# Patient Record
Sex: FEMALE | Race: WHITE | NOT HISPANIC OR LATINO | Employment: OTHER | URBAN - METROPOLITAN AREA
[De-identification: names, ages, dates, MRNs, and addresses within clinical notes are randomized per-mention and may not be internally consistent; named-entity substitution may affect disease eponyms.]

---

## 2024-04-01 ASSESSMENT — PROMIS GLOBAL HEALTH SCALE
RATE_PHYSICAL_HEALTH: VERY GOOD
EMOTIONAL_PROBLEMS: NEVER
CARRYOUT_PHYSICAL_ACTIVITIES: COMPLETELY
RATE_QUALITY_OF_LIFE: VERY GOOD
RATE_AVERAGE_FATIGUE: MILD
RATE_GENERAL_HEALTH: VERY GOOD
RATE_SOCIAL_SATISFACTION: VERY GOOD
CARRYOUT_SOCIAL_ACTIVITIES: EXCELLENT
RATE_MENTAL_HEALTH: VERY GOOD
RATE_AVERAGE_PAIN: 0

## 2024-04-07 PROBLEM — R42 VERTIGO: Status: ACTIVE | Noted: 2024-04-07

## 2024-04-07 PROBLEM — M62.82 RHABDOMYOLYSIS: Status: ACTIVE | Noted: 2024-04-07

## 2024-04-07 PROBLEM — M25.512 SHOULDER PAIN, LEFT: Status: ACTIVE | Noted: 2024-04-07

## 2024-04-07 PROBLEM — M81.0 OSTEOPOROSIS: Status: ACTIVE | Noted: 2024-04-07

## 2024-04-07 PROBLEM — M79.673 FOOT PAIN: Status: ACTIVE | Noted: 2024-04-07

## 2024-04-07 PROBLEM — E78.5 HYPERLIPIDEMIA: Status: ACTIVE | Noted: 2024-04-07

## 2024-04-07 PROBLEM — E55.9 VITAMIN D DEFICIENCY: Status: ACTIVE | Noted: 2024-04-07

## 2024-04-07 PROBLEM — K63.5 COLON POLYP: Status: ACTIVE | Noted: 2024-04-07

## 2024-04-07 PROBLEM — N81.11 CYSTOCELE, MIDLINE: Status: ACTIVE | Noted: 2023-06-22

## 2024-04-07 PROBLEM — I10 HYPERTENSION: Status: ACTIVE | Noted: 2024-04-07

## 2024-04-07 NOTE — PROGRESS NOTES
"Chief Complaint   Patient presents with    Medicare Annual Wellness Visit Subsequent     Pt here for AWV.         69 y.o. for AWV  Last visit 2023. Rarely checks bp at home. Has been ok.  Sees Dr Jenkins yearly.  Had ct calcium score.   Does have GERD. [place] about 5 lbs.  2 cups of coffee in am. Tomatoes irritate it.   Sometimes will have weeks that are ok.  Rare hot flash. Random  Some constipation.     Past Medical History  Hypertension  Hyperlipidemia statin d/c secondary to rhabdo. Dr Jenkins.   Vertigo  Osteoporosis DEXA 2021 T -3.1  Vitamin D deficiency  Rhabdomyolysis 2022- rosuvastatin and maxzide held, back on maxzide now.   GERD, moderate-large hiatal hernia on CT ca score 10/2022  Past surgical history  Trigger finger repair  Rectocele and cystocele repair Dr SORAYA Mcneil  Eye surgery     Social history   to Dr. Fernandez  3 sons. 2 grandchildren.  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 Sister is a pediatrician and older sister has osteoporosis    Blood pressure 110/62, pulse 76, temperature 36.6 °C (97.9 °F), height 1.581 m (5' 2.25\"), weight 64.7 kg (142 lb 9.6 oz), SpO2 96 %.  Body mass index is 25.87 kg/m².    Physical Examination glasses.   General: NAD. Alert.   HEENT: Normocephalic atraumatic.    Eyes: no scleral icterus. Extraocular movements intact.  Pupils equal round and reactive to light.  Ears: + cerumen. Hearing grossly intact.   Throat: No exudate  Neck:  Supple. No thyroid goiter.  Lymph nodes: No cervical or clavicular adenopathy  Cardiovascular: Regular rate rhythm S1-S2 normal no murmur. No carotid bruit.   Lungs: Clear to Auscultation without wheezing, rales, or rhonchi, Breath sounds symmetric. No use of accessory muscles  Abdomen:  Normoactive bowel sounds, soft, non-tender. No mass.  No organomegaly  Extremities: No " pretibial edema  Neuro: no facial asymmetry. Strength upper and lower extremities 5/5. Sensation intact to light touch. No tremor. Babinski negative  Skin: no rash noted  Vascular: PT pulses intact.   Breast: Both are symmetrical no nipple discharge.  No skin changes.  No mass palpated.  No axillary adenopathy.        Labs   6/15/22 CK 1719 (-192)     6/14/22 CK 2697  K 3.4 Cr 0.8 Glucose elevated 123   GPT elevated 61 (-56) got 105 (-37)  albumin low 3.3  covid negative  D 44  wbc 5.2 hg 15 hct 44.3 platelet 191     8/2021 cpk, lipid, cmp  cholesterol 181/62/94/124  Cr 0.8 glucose 87  lfts negative     3/2021 D, tsh,t4, cbc  D low 16     10/2022 CT calcium score: score 126. moderate-large hiatal hernia. Dr Jenkins.    ASSESSMENT/PLAN  AWV  Living Will: has a living will.   Cognition/Memory if 65 or above: 3/3 recall.   Hepatitis C (18-79) declines  HIV (18-64, once) n/a  Women: mammogram: 5/2023 ordered.   Dexa:  T -3.1 (3/2021) . recommend repeat 2024.   Colon cancer screening 45 and older: colonoscopy 2015 Dr Vollweiler. Plan repeat 5-10 years.   Immunization: reviewed.   Depression-denied    1. Encounter for screening mammogram for breast cancer  - BI mammo bilateral screening tomosynthesis; Future    2. Hyperlipidemia, unspecified hyperlipidemia type  - Lipid Panel; Future  - Comprehensive Metabolic Panel; Future    3. Hypertension, unspecified type  - Lipid Panel; Future  - Comprehensive Metabolic Panel; Future  - CBC; Future    4. Vitamin D deficiency  - Vitamin D 25-Hydroxy,Total (for eval of Vitamin D levels); Future    5. Asymptomatic early onset menopause  - XR DEXA bone density; Future    6 Routine general medical examination at health care facility        Current Outpatient Medications on File Prior to Visit   Medication Sig Dispense Refill    Bystolic 5 mg tablet Take 1 tablet (5 mg) by mouth once daily.      cholecalciferol (Vitamin D-3) 25 MCG (1000 UT) tablet Take 1 tablet (1,000 Units) by mouth  once daily.      lutein 10 mg tablet Take 1 tablet by mouth 4 times a week.      magnesium 250 mg tablet Take 1 tablet (250 mg) by mouth 3 times a week.      risedronate (Actonel) 35 mg tablet Take 1 tablet (35 mg) by mouth every 7 days.      rosuvastatin (Crestor) 5 mg tablet Take 1 tablet (5 mg) by mouth 3 times a week.      triamterene-hydrochlorothiazid (Maxzide-25) 37.5-25 mg tablet Take 1 tablet by mouth once daily.       No current facility-administered medications on file prior to visit.

## 2024-04-08 ENCOUNTER — OFFICE VISIT (OUTPATIENT)
Dept: PRIMARY CARE | Facility: CLINIC | Age: 69
End: 2024-04-08
Payer: MEDICARE

## 2024-04-08 VITALS
DIASTOLIC BLOOD PRESSURE: 62 MMHG | HEART RATE: 76 BPM | SYSTOLIC BLOOD PRESSURE: 110 MMHG | HEIGHT: 62 IN | WEIGHT: 142.6 LBS | OXYGEN SATURATION: 96 % | BODY MASS INDEX: 26.24 KG/M2 | TEMPERATURE: 97.9 F

## 2024-04-08 DIAGNOSIS — E78.5 HYPERLIPIDEMIA, UNSPECIFIED HYPERLIPIDEMIA TYPE: ICD-10-CM

## 2024-04-08 DIAGNOSIS — Z12.31 ENCOUNTER FOR SCREENING MAMMOGRAM FOR BREAST CANCER: Primary | ICD-10-CM

## 2024-04-08 DIAGNOSIS — I10 HYPERTENSION, UNSPECIFIED TYPE: ICD-10-CM

## 2024-04-08 DIAGNOSIS — Z00.00 ROUTINE GENERAL MEDICAL EXAMINATION AT HEALTH CARE FACILITY: ICD-10-CM

## 2024-04-08 DIAGNOSIS — E55.9 VITAMIN D DEFICIENCY: ICD-10-CM

## 2024-04-08 DIAGNOSIS — E28.319 ASYMPTOMATIC EARLY ONSET MENOPAUSE: ICD-10-CM

## 2024-04-08 PROBLEM — N81.6 RECTOCELE: Status: ACTIVE | Noted: 2024-04-08

## 2024-04-08 PROCEDURE — 1126F AMNT PAIN NOTED NONE PRSNT: CPT | Performed by: INTERNAL MEDICINE

## 2024-04-08 PROCEDURE — 1160F RVW MEDS BY RX/DR IN RCRD: CPT | Performed by: INTERNAL MEDICINE

## 2024-04-08 PROCEDURE — 3078F DIAST BP <80 MM HG: CPT | Performed by: INTERNAL MEDICINE

## 2024-04-08 PROCEDURE — 1170F FXNL STATUS ASSESSED: CPT | Performed by: INTERNAL MEDICINE

## 2024-04-08 PROCEDURE — 1036F TOBACCO NON-USER: CPT | Performed by: INTERNAL MEDICINE

## 2024-04-08 PROCEDURE — G0439 PPPS, SUBSEQ VISIT: HCPCS | Performed by: INTERNAL MEDICINE

## 2024-04-08 PROCEDURE — 3074F SYST BP LT 130 MM HG: CPT | Performed by: INTERNAL MEDICINE

## 2024-04-08 PROCEDURE — 1159F MED LIST DOCD IN RCRD: CPT | Performed by: INTERNAL MEDICINE

## 2024-04-08 RX ORDER — ROSUVASTATIN CALCIUM 5 MG/1
5 TABLET, COATED ORAL 3 TIMES WEEKLY
COMMUNITY
Start: 2017-07-07

## 2024-04-08 RX ORDER — NEBIVOLOL HYDROCHLORIDE 5 MG/1
5 TABLET ORAL DAILY
COMMUNITY
Start: 2012-08-14

## 2024-04-08 RX ORDER — TRIAMTERENE/HYDROCHLOROTHIAZID 37.5-25 MG
1 TABLET ORAL DAILY
COMMUNITY
Start: 2024-01-16

## 2024-04-08 RX ORDER — MAGNESIUM 250 MG
250 TABLET ORAL 3 TIMES WEEKLY
COMMUNITY
Start: 2013-10-07

## 2024-04-08 RX ORDER — CHOLECALCIFEROL (VITAMIN D3) 25 MCG
1000 TABLET ORAL DAILY
COMMUNITY

## 2024-04-08 RX ORDER — LUTEIN 10 MG
1 TABLET ORAL
COMMUNITY
Start: 2021-10-25

## 2024-04-08 RX ORDER — RISEDRONATE SODIUM 35 MG/1
35 TABLET, FILM COATED ORAL
COMMUNITY

## 2024-04-08 ASSESSMENT — ACTIVITIES OF DAILY LIVING (ADL)
DRESSING: INDEPENDENT
BATHING: INDEPENDENT
GROCERY_SHOPPING: INDEPENDENT
TAKING_MEDICATION: INDEPENDENT
MANAGING_FINANCES: INDEPENDENT
DOING_HOUSEWORK: INDEPENDENT

## 2024-04-08 ASSESSMENT — LIFESTYLE VARIABLES
HOW MANY STANDARD DRINKS CONTAINING ALCOHOL DO YOU HAVE ON A TYPICAL DAY: 1 OR 2
HOW OFTEN DO YOU HAVE A DRINK CONTAINING ALCOHOL: 2-4 TIMES A MONTH
HOW OFTEN DO YOU HAVE SIX OR MORE DRINKS ON ONE OCCASION: NEVER
SKIP TO QUESTIONS 9-10: 1
AUDIT-C TOTAL SCORE: 2

## 2024-04-08 ASSESSMENT — PATIENT HEALTH QUESTIONNAIRE - PHQ9
SUM OF ALL RESPONSES TO PHQ9 QUESTIONS 1 AND 2: 0
1. LITTLE INTEREST OR PLEASURE IN DOING THINGS: NOT AT ALL
2. FEELING DOWN, DEPRESSED OR HOPELESS: NOT AT ALL

## 2024-04-08 ASSESSMENT — PAIN SCALES - GENERAL: PAINLEVEL: 0-NO PAIN

## 2024-06-17 ENCOUNTER — HOSPITAL ENCOUNTER (OUTPATIENT)
Dept: RADIOLOGY | Facility: CLINIC | Age: 69
Discharge: HOME | End: 2024-06-17
Payer: MEDICARE

## 2024-06-17 VITALS — WEIGHT: 140 LBS | HEIGHT: 63 IN | BODY MASS INDEX: 24.8 KG/M2

## 2024-06-17 DIAGNOSIS — Z12.31 ENCOUNTER FOR SCREENING MAMMOGRAM FOR BREAST CANCER: ICD-10-CM

## 2024-06-17 PROCEDURE — 77067 SCR MAMMO BI INCL CAD: CPT | Performed by: RADIOLOGY

## 2024-06-17 PROCEDURE — 77067 SCR MAMMO BI INCL CAD: CPT

## 2024-06-17 PROCEDURE — 77063 BREAST TOMOSYNTHESIS BI: CPT | Performed by: RADIOLOGY

## 2024-08-21 ENCOUNTER — LAB (OUTPATIENT)
Dept: LAB | Facility: LAB | Age: 69
End: 2024-08-21
Payer: MEDICARE

## 2024-08-21 DIAGNOSIS — E78.5 HYPERLIPIDEMIA, UNSPECIFIED HYPERLIPIDEMIA TYPE: ICD-10-CM

## 2024-08-21 DIAGNOSIS — E55.9 VITAMIN D DEFICIENCY: ICD-10-CM

## 2024-08-21 DIAGNOSIS — I10 HYPERTENSION, UNSPECIFIED TYPE: ICD-10-CM

## 2024-08-21 LAB
25(OH)D3 SERPL-MCNC: 61 NG/ML (ref 30–100)
ALBUMIN SERPL BCP-MCNC: 4.2 G/DL (ref 3.4–5)
ALP SERPL-CCNC: 56 U/L (ref 33–136)
ALT SERPL W P-5'-P-CCNC: 12 U/L (ref 7–45)
ANION GAP SERPL CALC-SCNC: 10 MMOL/L (ref 10–20)
AST SERPL W P-5'-P-CCNC: 12 U/L (ref 9–39)
BILIRUB SERPL-MCNC: 0.5 MG/DL (ref 0–1.2)
BUN SERPL-MCNC: 17 MG/DL (ref 6–23)
CALCIUM SERPL-MCNC: 9.5 MG/DL (ref 8.6–10.3)
CHLORIDE SERPL-SCNC: 103 MMOL/L (ref 98–107)
CHOLEST SERPL-MCNC: 188 MG/DL (ref 0–199)
CHOLESTEROL/HDL RATIO: 3.4
CO2 SERPL-SCNC: 32 MMOL/L (ref 21–32)
CREAT SERPL-MCNC: 1 MG/DL (ref 0.5–1.05)
EGFRCR SERPLBLD CKD-EPI 2021: 61 ML/MIN/1.73M*2
ERYTHROCYTE [DISTWIDTH] IN BLOOD BY AUTOMATED COUNT: 13.8 % (ref 11.5–14.5)
GLUCOSE SERPL-MCNC: 84 MG/DL (ref 74–99)
HCT VFR BLD AUTO: 44.4 % (ref 36–46)
HDLC SERPL-MCNC: 55.7 MG/DL
HGB BLD-MCNC: 14.9 G/DL (ref 12–16)
LDLC SERPL CALC-MCNC: 107 MG/DL
MCH RBC QN AUTO: 31.2 PG (ref 26–34)
MCHC RBC AUTO-ENTMCNC: 33.6 G/DL (ref 32–36)
MCV RBC AUTO: 93 FL (ref 80–100)
NON HDL CHOLESTEROL: 132 MG/DL (ref 0–149)
NRBC BLD-RTO: 0 /100 WBCS (ref 0–0)
PLATELET # BLD AUTO: 229 X10*3/UL (ref 150–450)
POTASSIUM SERPL-SCNC: 4 MMOL/L (ref 3.5–5.3)
PROT SERPL-MCNC: 6.9 G/DL (ref 6.4–8.2)
RBC # BLD AUTO: 4.77 X10*6/UL (ref 4–5.2)
SODIUM SERPL-SCNC: 141 MMOL/L (ref 136–145)
TRIGL SERPL-MCNC: 128 MG/DL (ref 0–149)
VLDL: 26 MG/DL (ref 0–40)
WBC # BLD AUTO: 5.6 X10*3/UL (ref 4.4–11.3)

## 2024-08-21 PROCEDURE — 80061 LIPID PANEL: CPT

## 2024-08-21 PROCEDURE — 82306 VITAMIN D 25 HYDROXY: CPT

## 2024-08-21 PROCEDURE — 80053 COMPREHEN METABOLIC PANEL: CPT

## 2024-08-21 PROCEDURE — 36415 COLL VENOUS BLD VENIPUNCTURE: CPT

## 2024-08-21 PROCEDURE — 85027 COMPLETE CBC AUTOMATED: CPT

## 2025-04-06 NOTE — PROGRESS NOTES
"Chief Complaint   Patient presents with    Medicare Annual Wellness Visit Subsequent     Pt here for AWV       70 y.o. for AWV  Last visit 2024. Planning cruise [place], [place].   No GI or  complaints.  Did not some change in hearing-had ear wax. Better now but thinks she may not be catching everything. Sees Dr Jenkins in the fall.     Past Medical History  Hypertension  Hyperlipidemia statin d/c secondary to rhabdo. Dr Jenkins.   Vertigo  Osteoporosis DEXA 2024  Vitamin D deficiency  Rhabdomyolysis 2022- rosuvastatin and maxzide held, back on maxzide now.   GERD, moderate-large hiatal hernia on CT ca score 10/2022  Past surgical history  Trigger finger repair  Rectocele and cystocele repair Dr SORAYA Mcneil  Eye surgery     Social history   to Dr. Fernandez  3 sons. 2 grandchildren.  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 Sister is a pediatrician and older sister has osteoporosis    Blood pressure 110/68, pulse 53, temperature 36.6 °C (97.9 °F), height 1.575 m (5' 2\"), weight 61.4 kg (135 lb 6.4 oz), SpO2 97%.  Body mass index is 24.76 kg/m².    Physical Examination  General: NAD. Alert.   HEENT: Normocephalic atraumatic.    Eyes: no scleral icterus. Extraocular movements intact.  Pupils equal round and reactive to light.  Ears: TM intact.  No cerumen. Hearing grossly intact.   Throat: No exudate  Neck:  Supple. No thyroid goiter.  Lymph nodes: No cervical or clavicular adenopathy  Cardiovascular: Regular rate rhythm S1-S2 normal no murmur. No carotid bruit.   Lungs: Clear to Auscultation without wheezing, rales, or rhonchi, Breath sounds symmetric. No use of accessory muscles  Abdomen:  Normoactive bowel sounds, soft, non-tender. No mass.  No organomegaly  Extremities: No pretibial edema  Neuro: no facial asymmetry. Strength upper and lower " "extremities 5/5. Sensation intact to light touch. No tremor. Babinski negative  Skin: no rash noted  Vascular: DP pulses intact. No cyanosis.   Declined chaperone. Breast: Both are symmetrical no nipple discharge.  No skin changes.  No mass palpated.  No axillary adenopathy.      No results found for: \"HGBA1C\"  Lab Results   Component Value Date    GLUCOSE 84 08/21/2024    CALCIUM 9.5 08/21/2024     08/21/2024    K 4.0 08/21/2024    CO2 32 08/21/2024     08/21/2024    BUN 17 08/21/2024    CREATININE 1.00 08/21/2024     Lab Results   Component Value Date    ALT 12 08/21/2024    AST 12 08/21/2024    ALKPHOS 56 08/21/2024    BILITOT 0.5 08/21/2024     Lab Results   Component Value Date    WBC 5.6 08/21/2024    HGB 14.9 08/21/2024    HCT 44.4 08/21/2024    MCV 93 08/21/2024     08/21/2024     Lab Results   Component Value Date    CHOL 188 08/21/2024     Lab Results   Component Value Date    HDL 55.7 08/21/2024     Lab Results   Component Value Date    LDLCALC 107 (H) 08/21/2024     Lab Results   Component Value Date    TRIG 128 08/21/2024     No components found for: \"CHOLHDL\"      ASSESSMENT/PLAN  AWV  Living Will: has a living will   Cognition/Memory if 65 or above: intact.   Hepatitis C screen (18-79) declines  HIV screen(18-64, once)  n/a  Mammogram 06/2024, ordered  Dexa:  04/2024 T -3.4  plan repeat in 2026 (if stable consider stopping med after 5 years. She has been on approximately 3 years)   Colon cancer screening 45 and older: last scope 02/2015  Immunization: prevnar today  CT calcium score:  10/2022 Dr Jenkins.     If Smoker/Former smoker:    Age 50-75, 20 pack year history, quit within 15 years or currently smoking  (medicare 55-77, 30 pack year)  n/a    1. Encounter for screening mammogram for breast cancer (Primary)  - BI mammo bilateral screening tomosynthesis; Future    2. Hyperlipidemia, unspecified hyperlipidemia type    3. Hypertension, unspecified type  Well controlled.   - CBC; " Future  - CBC    4. Vitamin D deficiency  - Vitamin D 25-Hydroxy,Total (for eval of Vitamin D levels); Future  - Vitamin D 25-Hydroxy,Total (for eval of Vitamin D levels)    5. Need for prophylactic vaccination against Streptococcus pneumoniae (pneumococcus)  - Pneumococcal conjugate vaccine, 20-valent (PREVNAR 20)    6. Routine general medical examination at health care facility    - 1 Year Follow Up In Primary Care - Wellness Exam; Future  - Comprehensive Metabolic Panel; Future  - Comprehensive Metabolic Panel    7. Screening for hyperlipidemia  - Lipid Panel; Future  - Lipid Panel    8. Polyp of colon, unspecified part of colon, unspecified type  - Colonoscopy Screening; Average Risk Patient; Future      Current Outpatient Medications on File Prior to Visit   Medication Sig Dispense Refill    Bystolic 5 mg tablet Take 1 tablet (5 mg) by mouth once daily.      cholecalciferol (Vitamin D-3) 25 MCG (1000 UT) tablet Take 1 tablet (1,000 Units) by mouth once daily.      lutein 10 mg tablet Take 1 tablet by mouth 4 times a week.      magnesium 250 mg tablet Take 1 tablet (250 mg) by mouth 3 times a week.      risedronate (Actonel) 35 mg tablet Take 1 tablet (35 mg) by mouth every 7 days.      rosuvastatin (Crestor) 5 mg tablet Take 1 tablet (5 mg) by mouth 3 times a week.      triamterene-hydrochlorothiazid (Maxzide-25) 37.5-25 mg tablet Take 1 tablet by mouth once daily.      moxifloxacin (Vigamox) 0.5 % ophthalmic solution EYE SURGERY 5/6/25      prednisoLONE acetate (Pred-Forte) 1 % ophthalmic suspension EYE SURGERY 5/6/25       No current facility-administered medications on file prior to visit.

## 2025-04-07 ENCOUNTER — APPOINTMENT (OUTPATIENT)
Dept: PRIMARY CARE | Facility: CLINIC | Age: 70
End: 2025-04-07
Payer: MEDICARE

## 2025-04-07 VITALS
TEMPERATURE: 97.9 F | WEIGHT: 135.4 LBS | OXYGEN SATURATION: 97 % | DIASTOLIC BLOOD PRESSURE: 68 MMHG | HEIGHT: 62 IN | HEART RATE: 53 BPM | BODY MASS INDEX: 24.92 KG/M2 | SYSTOLIC BLOOD PRESSURE: 110 MMHG

## 2025-04-07 DIAGNOSIS — Z12.31 ENCOUNTER FOR SCREENING MAMMOGRAM FOR BREAST CANCER: Primary | ICD-10-CM

## 2025-04-07 DIAGNOSIS — E55.9 VITAMIN D DEFICIENCY: ICD-10-CM

## 2025-04-07 DIAGNOSIS — Z23 NEED FOR PROPHYLACTIC VACCINATION AGAINST STREPTOCOCCUS PNEUMONIAE (PNEUMOCOCCUS): ICD-10-CM

## 2025-04-07 DIAGNOSIS — Z13.220 SCREENING FOR HYPERLIPIDEMIA: ICD-10-CM

## 2025-04-07 DIAGNOSIS — E78.5 HYPERLIPIDEMIA, UNSPECIFIED HYPERLIPIDEMIA TYPE: ICD-10-CM

## 2025-04-07 DIAGNOSIS — I10 HYPERTENSION, UNSPECIFIED TYPE: ICD-10-CM

## 2025-04-07 DIAGNOSIS — K63.5 POLYP OF COLON, UNSPECIFIED PART OF COLON, UNSPECIFIED TYPE: ICD-10-CM

## 2025-04-07 DIAGNOSIS — Z00.00 ROUTINE GENERAL MEDICAL EXAMINATION AT HEALTH CARE FACILITY: ICD-10-CM

## 2025-04-07 PROCEDURE — 3008F BODY MASS INDEX DOCD: CPT | Performed by: INTERNAL MEDICINE

## 2025-04-07 PROCEDURE — G0009 ADMIN PNEUMOCOCCAL VACCINE: HCPCS | Performed by: INTERNAL MEDICINE

## 2025-04-07 PROCEDURE — 1123F ACP DISCUSS/DSCN MKR DOCD: CPT | Performed by: INTERNAL MEDICINE

## 2025-04-07 PROCEDURE — 1159F MED LIST DOCD IN RCRD: CPT | Performed by: INTERNAL MEDICINE

## 2025-04-07 PROCEDURE — 1170F FXNL STATUS ASSESSED: CPT | Performed by: INTERNAL MEDICINE

## 2025-04-07 PROCEDURE — 1036F TOBACCO NON-USER: CPT | Performed by: INTERNAL MEDICINE

## 2025-04-07 PROCEDURE — 3074F SYST BP LT 130 MM HG: CPT | Performed by: INTERNAL MEDICINE

## 2025-04-07 PROCEDURE — 1126F AMNT PAIN NOTED NONE PRSNT: CPT | Performed by: INTERNAL MEDICINE

## 2025-04-07 PROCEDURE — 90677 PCV20 VACCINE IM: CPT | Performed by: INTERNAL MEDICINE

## 2025-04-07 PROCEDURE — 1158F ADVNC CARE PLAN TLK DOCD: CPT | Performed by: INTERNAL MEDICINE

## 2025-04-07 PROCEDURE — 1160F RVW MEDS BY RX/DR IN RCRD: CPT | Performed by: INTERNAL MEDICINE

## 2025-04-07 PROCEDURE — 3078F DIAST BP <80 MM HG: CPT | Performed by: INTERNAL MEDICINE

## 2025-04-07 PROCEDURE — G0439 PPPS, SUBSEQ VISIT: HCPCS | Performed by: INTERNAL MEDICINE

## 2025-04-07 RX ORDER — MOXIFLOXACIN 5 MG/ML
SOLUTION/ DROPS OPHTHALMIC
COMMUNITY
Start: 2025-03-03

## 2025-04-07 RX ORDER — PREDNISOLONE ACETATE 10 MG/ML
SUSPENSION/ DROPS OPHTHALMIC
COMMUNITY
Start: 2025-03-03

## 2025-04-07 ASSESSMENT — LIFESTYLE VARIABLES
AUDIT-C TOTAL SCORE: 2
HOW OFTEN DO YOU HAVE SIX OR MORE DRINKS ON ONE OCCASION: NEVER
SKIP TO QUESTIONS 9-10: 1
HOW MANY STANDARD DRINKS CONTAINING ALCOHOL DO YOU HAVE ON A TYPICAL DAY: 1 OR 2
HOW OFTEN DO YOU HAVE A DRINK CONTAINING ALCOHOL: 2-4 TIMES A MONTH

## 2025-04-07 ASSESSMENT — ACTIVITIES OF DAILY LIVING (ADL)
BATHING: INDEPENDENT
GROCERY_SHOPPING: INDEPENDENT
DOING_HOUSEWORK: INDEPENDENT
DRESSING: INDEPENDENT
MANAGING_FINANCES: INDEPENDENT
TAKING_MEDICATION: INDEPENDENT

## 2025-04-07 ASSESSMENT — PATIENT HEALTH QUESTIONNAIRE - PHQ9
2. FEELING DOWN, DEPRESSED OR HOPELESS: NOT AT ALL
SUM OF ALL RESPONSES TO PHQ9 QUESTIONS 1 AND 2: 0
1. LITTLE INTEREST OR PLEASURE IN DOING THINGS: NOT AT ALL

## 2025-04-07 ASSESSMENT — PAIN SCALES - GENERAL: PAINLEVEL_OUTOF10: 0-NO PAIN

## 2025-07-17 DIAGNOSIS — E78.5 HYPERLIPIDEMIA, UNSPECIFIED HYPERLIPIDEMIA TYPE: Primary | ICD-10-CM

## 2025-07-17 DIAGNOSIS — I10 HYPERTENSION, UNSPECIFIED TYPE: ICD-10-CM

## 2025-07-17 DIAGNOSIS — E55.9 VITAMIN D DEFICIENCY: ICD-10-CM

## 2025-07-17 LAB
25(OH)D3+25(OH)D2 SERPL-MCNC: 78 NG/ML (ref 30–100)
ALBUMIN SERPL-MCNC: 4.3 G/DL (ref 3.6–5.1)
ALP SERPL-CCNC: 59 U/L (ref 37–153)
ALT SERPL-CCNC: 13 U/L (ref 6–29)
ANION GAP SERPL CALCULATED.4IONS-SCNC: 12 MMOL/L (CALC) (ref 7–17)
AST SERPL-CCNC: 14 U/L (ref 10–35)
BILIRUB SERPL-MCNC: 0.6 MG/DL (ref 0.2–1.2)
BUN SERPL-MCNC: 22 MG/DL (ref 7–25)
CALCIUM SERPL-MCNC: 9.7 MG/DL (ref 8.6–10.4)
CHLORIDE SERPL-SCNC: 102 MMOL/L (ref 98–110)
CHOLEST SERPL-MCNC: 171 MG/DL
CHOLEST/HDLC SERPL: 2.9 (CALC)
CO2 SERPL-SCNC: 27 MMOL/L (ref 20–32)
CREAT SERPL-MCNC: 0.92 MG/DL (ref 0.6–1)
EGFRCR SERPLBLD CKD-EPI 2021: 67 ML/MIN/1.73M2
ERYTHROCYTE [DISTWIDTH] IN BLOOD BY AUTOMATED COUNT: 13.3 % (ref 11–15)
GLUCOSE SERPL-MCNC: 96 MG/DL (ref 65–99)
HCT VFR BLD AUTO: 45.1 % (ref 35–45)
HDLC SERPL-MCNC: 59 MG/DL
HGB BLD-MCNC: 14.8 G/DL (ref 11.7–15.5)
LDLC SERPL CALC-MCNC: 95 MG/DL (CALC)
MCH RBC QN AUTO: 31 PG (ref 27–33)
MCHC RBC AUTO-ENTMCNC: 32.8 G/DL (ref 32–36)
MCV RBC AUTO: 94.5 FL (ref 80–100)
NONHDLC SERPL-MCNC: 112 MG/DL (CALC)
PLATELET # BLD AUTO: 258 THOUSAND/UL (ref 140–400)
PMV BLD REES-ECKER: 11 FL (ref 7.5–12.5)
POTASSIUM SERPL-SCNC: 4 MMOL/L (ref 3.5–5.3)
PROT SERPL-MCNC: 6.9 G/DL (ref 6.1–8.1)
RBC # BLD AUTO: 4.77 MILLION/UL (ref 3.8–5.1)
SODIUM SERPL-SCNC: 141 MMOL/L (ref 135–146)
TRIGL SERPL-MCNC: 77 MG/DL
WBC # BLD AUTO: 6.2 THOUSAND/UL (ref 3.8–10.8)

## 2026-04-08 ENCOUNTER — APPOINTMENT (OUTPATIENT)
Dept: PRIMARY CARE | Facility: CLINIC | Age: 71
End: 2026-04-08
Payer: MEDICARE